# Patient Record
Sex: FEMALE | Race: WHITE | NOT HISPANIC OR LATINO | Employment: STUDENT | URBAN - METROPOLITAN AREA
[De-identification: names, ages, dates, MRNs, and addresses within clinical notes are randomized per-mention and may not be internally consistent; named-entity substitution may affect disease eponyms.]

---
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
## 2025-04-21 ENCOUNTER — OFFICE VISIT (OUTPATIENT)
Dept: FAMILY MEDICINE | Facility: CLINIC | Age: 20
End: 2025-04-21
Payer: COMMERCIAL

## 2025-04-21 ENCOUNTER — HOSPITAL ENCOUNTER (OUTPATIENT)
Dept: RADIOLOGY | Facility: HOSPITAL | Age: 20
Discharge: HOME | End: 2025-04-21
Payer: COMMERCIAL

## 2025-04-21 ENCOUNTER — RESULTS FOLLOW-UP (OUTPATIENT)
Dept: FAMILY MEDICINE | Facility: CLINIC | Age: 20
End: 2025-04-21

## 2025-04-21 VITALS
HEART RATE: 95 BPM | SYSTOLIC BLOOD PRESSURE: 116 MMHG | OXYGEN SATURATION: 99 % | TEMPERATURE: 98 F | WEIGHT: 136.88 LBS | DIASTOLIC BLOOD PRESSURE: 68 MMHG | BODY MASS INDEX: 25.19 KG/M2 | HEIGHT: 62 IN

## 2025-04-21 DIAGNOSIS — N63.0 BREAST MASS IN FEMALE: ICD-10-CM

## 2025-04-21 DIAGNOSIS — N63.0 BREAST MASS IN FEMALE: Primary | ICD-10-CM

## 2025-04-21 PROCEDURE — 3008F BODY MASS INDEX DOCD: CPT | Mod: CPTII,S$GLB,,

## 2025-04-21 PROCEDURE — 99999 PR PBB SHADOW E&M-EST. PATIENT-LVL IV: CPT | Mod: PBBFAC,,,

## 2025-04-21 PROCEDURE — 3074F SYST BP LT 130 MM HG: CPT | Mod: CPTII,S$GLB,,

## 2025-04-21 PROCEDURE — 76642 ULTRASOUND BREAST LIMITED: CPT | Mod: 26,LT,, | Performed by: RADIOLOGY

## 2025-04-21 PROCEDURE — 1159F MED LIST DOCD IN RCRD: CPT | Mod: CPTII,S$GLB,,

## 2025-04-21 PROCEDURE — 99213 OFFICE O/P EST LOW 20 MIN: CPT | Mod: S$GLB,,,

## 2025-04-21 PROCEDURE — 1160F RVW MEDS BY RX/DR IN RCRD: CPT | Mod: CPTII,S$GLB,,

## 2025-04-21 PROCEDURE — 3078F DIAST BP <80 MM HG: CPT | Mod: CPTII,S$GLB,,

## 2025-04-21 PROCEDURE — 76642 ULTRASOUND BREAST LIMITED: CPT | Mod: TC,LT

## 2025-04-21 NOTE — PROGRESS NOTES
"Subjective:       Patient ID: Ewa He is a 19 y.o. female.    Chief Complaint: bump under left breast      Ewa He is a 19 y.o. female who presents to clinic for evaluation of a bump under the left breast x 1 month. Noticed pea-sized bump under the left breast about 1 month ago. Visited her OBGYN for routine exam, who advised her it may be a lymph node but to follow up with primary care if the area became larger. The area has gotten larger over the span of 1 month, and she has noticed some pain in the area especially with putting her bra on. Denies fever, chills, night sweats, changes in appetite or weight. Family history of breast cancer (maternal great grandmother). No family history of ovarian or endometrial cancers. She does take birth control pills. LMP about 3 weeks ago.         Review of Systems   Constitutional:  Negative for appetite change, diaphoresis, fatigue, fever and unexpected weight change.   Skin:         Left breast pain         Past Medical History:   Diagnosis Date    Otitis media        Review of patient's allergies indicates:   Allergen Reactions    Bee pollens Anaphylaxis    Other Anaphylaxis     Ant bites       Current Medications[1]    Objective:        Physical Exam  Vitals reviewed. Exam conducted with a chaperone present.   Constitutional:       Appearance: Normal appearance.   HENT:      Head: Normocephalic and atraumatic.   Eyes:      Conjunctiva/sclera: Conjunctivae normal.   Cardiovascular:      Rate and Rhythm: Normal rate.   Pulmonary:      Effort: Pulmonary effort is normal.   Chest:       Musculoskeletal:      Cervical back: Normal range of motion.   Skin:     General: Skin is warm and dry.   Neurological:      Mental Status: She is alert and oriented to person, place, and time.   Psychiatric:         Behavior: Behavior normal.           Visit Vitals  /68 (BP Location: Right arm, Patient Position: Sitting)   Pulse 95   Temp 98 °F (36.7 °C) (Oral)   Ht 5' 1.5" (1.562 " m)   Wt 62.1 kg (136 lb 14.5 oz)   SpO2 99%   BMI 25.45 kg/m²      Assessment:         1. Breast mass in female        Plan:         Diagnoses and all orders for this visit:    Breast mass in female  -     US Breast Right Limited; Future  -     Possibly fibrocystic breast changes. Recommend NSAIDs, supportive bra, decrease caffeine intake. Further recommendations made pending ultrasound imaging.             Family Medicine Physician Assistant          All of your core healthy metrics are met.       I spent a total of 20 minutes on the day of the visit.This includes face to face time and non-face to face time preparing to see the patient (eg, review of tests), obtaining and/or reviewing separately obtained history, documenting clinical information in the electronic or other health record, independently interpreting results and communicating results to the patient/family/caregiver, or care coordinator.         [1]   Current Outpatient Medications:     azelastine (ASTELIN) 137 mcg (0.1 %) nasal spray, 1 spray (137 mcg total) by Nasal route 2 (two) times daily as needed for Rhinitis., Disp: 30 mL, Rfl: 0    benzocaine-menthoL 6-10 mg lozenge, Take 1 lozenge by mouth every 2 (two) hours as needed (Sore Throat)., Disp: 18 tablet, Rfl: 0    EPINEPHrine (EPIPEN JR) 0.15 mg/0.3 mL pen injection, Inject 0.3 mLs (0.15 mg total) into the muscle once as needed for Anaphylaxis., Disp: 2 each, Rfl: 6    MICROGESTIN FE 1.5/30, 28, 1.5 mg-30 mcg (21)/75 mg (7) tablet, Take 1 tablet by mouth., Disp: , Rfl:     valACYclovir (VALTREX) 500 MG tablet, Take 1 tablet (500 mg total) by mouth 2 (two) times daily. for 5 days, Disp: 10 tablet, Rfl: 5

## 2025-04-22 ENCOUNTER — TELEPHONE (OUTPATIENT)
Dept: FAMILY MEDICINE | Facility: CLINIC | Age: 20
End: 2025-04-22
Payer: COMMERCIAL

## 2025-04-22 NOTE — TELEPHONE ENCOUNTER
----- Message from Med Assistant Quiroz sent at 4/22/2025 10:45 AM CDT -----  Pt mom would like to know the reasoning for avoiding NSAIDS  ----- Message -----  From: Georgie Montalvo PA-C  Sent: 4/21/2025   5:10 PM CDT  To: Selvin Jacques Staff    Sorry - left breast!   ----- Message -----  From: Chrissy Mead MD  Sent: 4/21/2025   2:56 PM CDT  To: Georgie Montalvo PA-C

## 2025-04-22 NOTE — TELEPHONE ENCOUNTER
Recommend NSAIDs, supportive bra and trying to decrease caffeine intake. She does not need to avoid NSAIDs :)

## 2025-07-03 ENCOUNTER — OCCUPATIONAL HEALTH (OUTPATIENT)
Dept: URGENT CARE | Facility: CLINIC | Age: 20
End: 2025-07-03

## 2025-07-03 DIAGNOSIS — Z02.83 ENCOUNTER FOR DRUG SCREENING: Primary | ICD-10-CM

## 2025-07-03 LAB — BREATH ALCOHOL: 0
